# Patient Record
Sex: FEMALE | Race: WHITE | Employment: FULL TIME | URBAN - METROPOLITAN AREA
[De-identification: names, ages, dates, MRNs, and addresses within clinical notes are randomized per-mention and may not be internally consistent; named-entity substitution may affect disease eponyms.]

---

## 2019-10-15 ENCOUNTER — TELEPHONE (OUTPATIENT)
Dept: NEUROLOGY | Age: 34
End: 2019-10-15

## 2019-10-16 ENCOUNTER — TELEPHONE (OUTPATIENT)
Dept: NEUROLOGY | Age: 34
End: 2019-10-16

## 2019-10-16 NOTE — TELEPHONE ENCOUNTER
No referral rcvd, called referring office and left v/m with stephie in referral dept. Request referral and notes, called pt to advise and left v/m.

## 2019-10-16 NOTE — TELEPHONE ENCOUNTER
----- Message from Stewart Moreno sent at 10/16/2019  1:54 PM EDT -----  Regarding: Dr. Niya Salcedo  Patient return call    Caller's first and last name and relationship (if not the patient):  pt    Best contact number(s):  [phone]    Whose call is being returned:  unsure    Details to clarify the request:  Returning a miss call from the office in regards to scheduling an appt. Pt requesting an appt before the end of the month.      Stewart Moreno

## 2019-10-31 ENCOUNTER — OFFICE VISIT (OUTPATIENT)
Dept: NEUROLOGY | Age: 34
End: 2019-10-31

## 2019-10-31 DIAGNOSIS — F41.9 ANXIETY DISORDER, UNSPECIFIED TYPE: Primary | ICD-10-CM

## 2019-10-31 NOTE — PROGRESS NOTES
This note will not be viewable in [address] [facility] [address]    Office:  [phone]  Fax: [phone]                 Initial Office Exam    Patient Name: Abby James  Age: 29 y.o. 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 Primary Care Physician: Jose Raul Valero  Referring Provider: Jael Pitts PA-C      REASON FOR REFERRAL:  This comprehensive and medically necessary neuropsychological assessment was requested to assist a differential diagnosis of ADHD. The use and purpose of this examination, as well as the extent and limitations of confidentiality, were explained prior to obtaining permission to participate. Instructions were provided regarding the necessity to put forth optimal effort and answer questions truthfully in order to obtain reliable and accurate test results. PERTINENT HISTORY:  Ms. Madonna Link presented for a neuropsychological assessment at the recommendation of she treating physician secondary to complaints of attentional problems that include not listening, easily distracted, loses track of things, starts tasks but doesn't complete them, difficulty completing task in the allotted time, racing thoughts, and talks rapidly . Ms. Madonna Link began noticing symptoms about a year ago. From a brief review of her medical and personal history there has not been any other significant neurological injury or illness noted or reported. She did not report experiencing depression or anxiety in the past.  She has a family history of ADHD in her mom and sister. Another sister is described as having mental health concerns, but not de    Ms. Cormier does not  report any problems at birth or difficulties meeting developmental milestones. She reports that she had an adequate level of family support and was not subject to trauma or abuse as a child. Ms. Beverly Abrams does not  report being retain in school or receiving special assistance in any of she classes or subjects. Ms. Beverly Abrams completed 14 years of education. Ms. Beverly Abrams does  exercise on a regular basis and does  maintain a balanced diet. She does  report problems with her sleep and does not  complain of pain. She does  participate in mentally stimulating activities. Ms. Beverly Abrams does not  have concerns regarding prescription medications, family members, place of residence, or financial stressors. Ms. Beverly Abrams indicated that she is independent in her instrumental activities of daily living, including shopping, meal preparation, housekeeping, doing laundry, driving a car, managing medications, and finances. No current outpatient medications on file. No current facility-administered medications for this visit. No past medical history on file. No flowsheet data found. No data recorded    No past surgical history on file. Social History     Socioeconomic History    Marital status: SINGLE     Spouse name: Not on file    Number of children: Not on file    Years of education: Not on file    Highest education level: Not on file       No family history on file. CT Results (most recent):  No results found for this or any previous visit. MRI Results (most recent):  No results found for this or any previous visit. MENTAL STATUS:    Orientation: Fully oriented   Eye Contact: Good eye contact   Motor Behavior:  Ambulates independently   Speech:  Rapid, fluent, intelligible   Thought Process:  Tangential    Thought Content: No evidence of hallucinations or delusions   Suicidal ideations: Denies   Mood:  Euthymic   Affect:  Full range   Concentration:  adequate   Abstraction:  adequate   Insight:  WNL     On the Modified Mini-Mental Status Exam: 93/100 (WNL)      DIAGNOSTIC IMPRESSIONS:    ICD-10-CM ICD-9-CM    1. Anxiety disorder, unspecified type F41.9 300.00              PLAN:  1. Complete a comprehensive neuropsychological assessment to provide a differential diagnosis of presenting concerns as well as to assist with disposition and treatment planning as appropriate. 2. Consider compensatory and remedial cognitive training. 3. Consider nonpharmacological interventions for anxiety management. 55976 x 1 Review of records. Face to face interview w/ patient. Determine test protocol: 60 minutes. Total 1 unit        Caleb Fernandez, PhD, ABPP, LCP  Licensed Clinical Psychologist/ Neuropsychologist        This note will not be viewable in [address]

## 2019-11-25 ENCOUNTER — OFFICE VISIT (OUTPATIENT)
Dept: NEUROLOGY | Age: 34
End: 2019-11-25

## 2019-11-25 DIAGNOSIS — G31.84 AMNESTIC MCI (MILD COGNITIVE IMPAIRMENT WITH MEMORY LOSS): Primary | ICD-10-CM

## 2019-11-27 NOTE — PROGRESS NOTES
This note will not be viewable in [address] [facility] at [facility] [address], [address] [facility]  [address] [place] [address] Ne, [address]    Office:  [phone]  Fax: [phone]                                               Neuropsychological Evaluation Report    Patient Name: Elizabeth Cavazos  Age: 29 y.o. 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 Primary Care Physician: Anton Camacho  Referring Provider: Anibal Neff PA-C       PATIENT HISTORY (OBTAINED DURING INITIAL CLINICAL EVALUATION):    REASON FOR REFERRAL:  This comprehensive and medically necessary neuropsychological assessment was requested to assist a differential diagnosis of ADHD. The use and purpose of this examination, as well as the extent and limitations of confidentiality, were explained prior to obtaining permission to participate. Instructions were provided regarding the necessity to put forth optimal effort and answer questions truthfully in order to obtain reliable and accurate test results.     PERTINENT HISTORY:  Ms. Swati Gracia presented for a neuropsychological assessment at the recommendation of she treating physician secondary to complaints of attentional problems that include not listening, easily distracted, loses track of things, starts tasks but doesn't complete them, difficulty completing task in the allotted time, racing thoughts, and talks rapidly . Ms. Swati Gracia began noticing symptoms about a year ago. From a brief review of her medical and personal history there has not been any other significant neurological injury or illness noted or reported. She did not report experiencing depression or anxiety in the past.  She has a family history of ADHD in her mom and sister. Another sister is described as having mental health concerns, but not de     Ms. Cormier does not  report any problems at birth or difficulties meeting developmental milestones. She reports that she had an adequate level of family support and was not subject to trauma or abuse as a child. Ms. Aneudy Blackmon does not  report being retain in school or receiving special assistance in any of she classes or subjects. Ms. Aneudy Blackmon completed 14 years of education.     Ms. Cormier does  exercise on a regular basis and does  maintain a balanced diet. She does  report problems with her sleep and does not  complain of pain. She does  participate in mentally stimulating activities. Ms. Aneudy Blackmon does not  have concerns regarding prescription medications, family members, place of residence, or financial stressors. Ms. Aneudy Blackmon indicated that she is independent in her instrumental activities of daily living, including shopping, meal preparation, housekeeping, doing laundry, driving a car, managing medications, and finances. METHODS OF ASSESSMENT (Current Evaluation):  Clinician Administered:  Clinical Interview  Review of Medical Records  Brown Attention Deficit Scales  Clock Drawing Task  Modified Mini-Mental Status Exam (3MS)  Test of Premorbid Functioning  Personality Assessment Inventory  Revised Memory and Behavior Checklist    Technician Administered:  Ever's Continuous Performance Test -3  Neuropsychological Assessment Battery   NAB: Attention Module   NAB: Executive Functions Module  RBANS-A  Structured Inventory of Malingering Symptoms  Trail Making Test  Word Choice Effort Test (ACS)    TEST OBSERVATIONS:  Ms. Aneudy Blackmon arrived promptly for the testing session. Dress and grooming were appropriate; physical presentation was unchanged from that observed during the clinical interview. Speech was fluent, intelligible, and goal-directed. Affect was congruent with the euthymic mood conveyed. Ms. Aneudy Blackmon was adequately cooperative and appeared to put forth good effort throughout this examination.   Rapport with the examiner was adequately established and maintained. Minimal prompting was required. Comprehension of test instructions was not problematic. Performance motivation was objectively measured by four instruments (JUAN, WC Effort, Reliable Digit Span, RBANS ES), and Ms. Cormier produced a normal score on four of these measures. Accordingly, test findings below do not appear to be the product of disingenuous effort. Given the above observations, plus comments contained in the Mental Status section, the results of this examination are regarded as reasonably reliable and valid. TEST RESULTS:  Quantitative test results are derived from comparisons to age and education corrected cohort normative data, where applicable. Percentiles are included in these instances. Qualitative test results are determined using clinical observations. General Orientation and Awareness:       Orientation to person, year, month, day of month, day of week, state, town, and circumstance.    Awareness of deficits:WNL                   Cognitive performance validity testing: Valid        Attention/Concentration:      Classification:      Coding (70 percentile)           Average  Simple visuomotor tracking (31 percentile)               Average  Digits forward (76 percentile)                 High Average  Digits backward (18 percentile)                 Low Average  Visual scanning (1 percentile)                            Severely Impaired  Simple information processing  efficiency (31 percentile)  Average   Complex information processing efficiency (5 percentile)  Mildly Impaired  Attention to visual detail of driving scenes (<1 percentile)             Severely Impaired    Naomi Continuous Performance Test - 3    Variable Type Measure T-Score Percentile Guideline     Detectability     d'   58   79   High Average     Error Type     Omissions     48   42   Average      Commissions     63   90   Elevated    Perseverative  Commissions   48   42 Average     Reaction Time     HRT     53   62   Average        HRT SD     54   66   Average      HRT Block  Change   54   66   Average       Visuospatial and Constructional Praxis:     Figure copy (45 percentile)                                       Average  Line orientation (86 percentile)                                          High Average     Language:         Picture naming (70 percentile)                               Average  Semantic fluency (23 percentile)                    Low Average    Memory and Learning:       Immediate word list learning (5 percentile)               Mildly Impaired  Delayed word list recall (3 percentile)                  Moderately Impaired  Delayed word list recognition (0.5 percentile)               Severely Impaired  Immediate story memory (0.3 percentile)                  Severely Impaired  Delayed story recall (2 percentile)                Moderately Impaired  Delayed figure recall (4 percentile)                Moderately Impaired    Cognitive Tests of Executive Functioning:     Ability to think flexibly, Trail Making B (35 percentile)              Average  Mazes (95 percentile)                  Superior  Simple judgment in daily decision making (54 percentile)             Average  Categories (18 percentile)                 Low Average  Word generation (38 percentile)                  Average    Intellectual and Basic Cognitive Functioning (WAIS-IV):  ACS Test of pre-morbid functioning reading recognition lower limit estimated WAIS-IV FSIQ score:       Estimated full scale IQ:       97      42 percentile      Average    Emotional Functioning:  Ms. Isabelle Slaughter was administered the AARON to determine the level of emotional distress she may be experiencing at the time of this evaluation. Validity scales within the AARON suggest that she may not have been completely forthright her responses which may lead to an inaccurate impression of her due to her style of responding.   She has a tendency to present herself in a favorable light and as being relatively free of common shortcomings. This tendency may lead her to a minimize or perhaps even be unaware of problem areas. As such, the validity and reliability of this assessment should be considered with caution. In general, her clinical profile reveals no elevations of clinical concern. IMPRESSIONS:  Ms. Elham Ny was seen for a neuropsychological evaluation to determine her current level of cognitive functioning. She was administered a battery of measures across the domains of attention, visual-spatial, language, memory and executive functioning. Her overall level of cognitive functioning was in the low average range, with her performance on measures of attention, visual-spatial, language, and executive functioning all falling within the low average to average range. Specific problem areas for Ms. Cormier included visual working memory, visual attention to detail, and complex information processing. Her performance on a measure of inattention, sustain attention, impulsivity, and vigilance, failed to provide support for the diagnosis of ADHD. The only measures falling significantly below expectations were those of memory function, and her memory domain overall was in the severely impaired range. In summary, this current evaluation is most consistent with a fairly circumscribed memory or amnestic disorder. 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 Findings should be reviewed with Ms. Cormier to insure her understanding and discuss the potential implications. 2. Emphasis should be placed on Ms. Cormier obtaining good sleep hygiene and maintaining adequate physical exercise to promote good brain health. 3. Ms. Tonya Chicas may benefit from a referral to psychotherapy to address anxiety and work on adequate stress management skills. 4. Ms. Tonya Chicas is encouraged to seek out various forms of mental stimulation that would help to \"exercise\" her brain. This might include learning a new skill, hobby, travel, attending lectures, or evening reading or listening to podcasts or videos on topics of her interest.      Thank you for allowing me the opportunity to assist you in Ms. Cormier's care. Please do not hesitate to contact me should you have additional questions that I may not have addressed. 85187 x 1  96138 x 1  96139 x 6  96132 x 1  96133 x 2          Daxa Alvarez, Ph.D., ABPP  Licensed Clinical Psychologist  Neuropsychologist  Board Certified Rehabilitation Psychologist      Time Documentation:     00775 x 1: Neurobehavioral Status Exam/Clinical Interview: (1 hour, (already billed on first date of service)     50485*8 Neuropsych testing/data gathering by Neuropsychologist (35 additional minutes, see above)      96138 x 1  96139 x 6 Test Administration/Data Gathering By Technician: (3.5 hours). 67721 x 1 (first 30 minutes), 72799 x 7 (each additional 30 minutes)     96132 x 1  96133 x 1 Testing Evaluation Services by Neuropsychologist (1 hour, 50 minutes) 96132 x 1 (first hour), 96133 x 1 (50 minutes)     Definitions:       48945/56142:  Neurobehavioral Status Exam, Clinical interview.   Clinical assessment of thinking, reasoning and judgment, by neuropsychologist, both face to face time with patient and time interpreting those test results and reporting, first and subsequent hours)     20336/50790: Neuropsychological Test Administration by Technician/Psychometrist, first 30 minutes and each additional 30 minutes. The above includes: Record review. Review of history provided by patient. Review of collaborative information. Testing by Clinician. Review of raw data. Scoring. Report writing of individual tests administered by Clinician. Integration of individual tests administered by psychometrist with NSE/testing by clinician, review of records/history/collaborative information, case Conceptualization, treatment planning, clinical decision making, report writing, coordination Of Care. Psychometry test codes as time spent by psychometrist administering and scoring neurocognitive/psychological tests under supervision of neuropsychologist.  Integral services including scoring of raw data, data interpretation, case conceptualization, report writing etcetera were initiated after the patient finished testing/raw data collected and was completed on the date the report was signed. This note will not be viewable in [address]

## 2019-12-06 ENCOUNTER — TELEPHONE (OUTPATIENT)
Dept: NEUROLOGY | Age: 34
End: 2019-12-06

## 2025-06-17 ENCOUNTER — OFFICE VISIT (OUTPATIENT)
Age: 40
End: 2025-06-17

## 2025-06-17 VITALS
HEIGHT: 68 IN | HEART RATE: 75 BPM | OXYGEN SATURATION: 90 % | TEMPERATURE: 97.9 F | BODY MASS INDEX: 20.92 KG/M2 | SYSTOLIC BLOOD PRESSURE: 108 MMHG | RESPIRATION RATE: 18 BRPM | DIASTOLIC BLOOD PRESSURE: 76 MMHG | WEIGHT: 138 LBS

## 2025-06-17 DIAGNOSIS — R35.0 FREQUENT URINATION: ICD-10-CM

## 2025-06-17 DIAGNOSIS — N92.6 IRREGULAR MENSES: ICD-10-CM

## 2025-06-17 DIAGNOSIS — R39.9 UTI SYMPTOMS: Primary | ICD-10-CM

## 2025-06-17 LAB
BILIRUBIN, URINE, POC: NEGATIVE
BLOOD URINE, POC: NEGATIVE
GLUCOSE URINE, POC: NEGATIVE
HCG, PREGNANCY, URINE, POC: NEGATIVE
KETONES, URINE, POC: NEGATIVE
LEUKOCYTE ESTERASE, URINE, POC: NEGATIVE
NITRITE, URINE, POC: NEGATIVE
PH, URINE, POC: 7 (ref 4.6–8)
PROTEIN,URINE, POC: NEGATIVE
SPECIFIC GRAVITY, URINE, POC: 1.01 (ref 1–1.03)
URINALYSIS CLARITY, POC: CLEAR
URINALYSIS COLOR, POC: YELLOW
UROBILINOGEN, POC: NORMAL MG/DL
VALID INTERNAL CONTROL, POC: NORMAL

## 2025-06-17 RX ORDER — PHENAZOPYRIDINE HYDROCHLORIDE 200 MG/1
200 TABLET, FILM COATED ORAL 3 TIMES DAILY PRN
Qty: 9 TABLET | Refills: 0 | Status: SHIPPED | OUTPATIENT
Start: 2025-06-17 | End: 2025-06-20

## 2025-06-17 RX ORDER — LEVONORGESTREL AND ETHINYL ESTRADIOL 0.15-0.03
1 KIT ORAL DAILY
COMMUNITY
Start: 2025-06-04

## 2025-06-17 RX ORDER — DEXTROAMPHETAMINE SACCHARATE, AMPHETAMINE ASPARTATE, DEXTROAMPHETAMINE SULFATE AND AMPHETAMINE SULFATE 2.5; 2.5; 2.5; 2.5 MG/1; MG/1; MG/1; MG/1
1 TABLET ORAL DAILY
COMMUNITY
Start: 2025-05-22

## 2025-06-17 RX ORDER — NITROFURANTOIN 25; 75 MG/1; MG/1
100 CAPSULE ORAL 2 TIMES DAILY
Qty: 10 CAPSULE | Refills: 0 | Status: SHIPPED | OUTPATIENT
Start: 2025-06-17 | End: 2025-06-22

## 2025-06-17 RX ORDER — DEXTROAMPHETAMINE SACCHARATE, AMPHETAMINE ASPARTATE MONOHYDRATE, DEXTROAMPHETAMINE SULFATE AND AMPHETAMINE SULFATE 5; 5; 5; 5 MG/1; MG/1; MG/1; MG/1
1 CAPSULE, EXTENDED RELEASE ORAL EVERY MORNING
COMMUNITY
Start: 2025-05-22

## 2025-06-17 ASSESSMENT — ENCOUNTER SYMPTOMS: RESPIRATORY NEGATIVE: 1

## 2025-06-17 NOTE — PROGRESS NOTES
2025   Madison Daley (: 1985) is a 39 y.o. female, New patient, here for evaluation of the following chief complaint(s):  Urinary Tract Infection (Pt states that about 2 weeks ago she was treated for a UTI but after 5 days of doxycyline her sx have not resolved. Pt states that she is mostly just dealing with bladder spasms now.  )       ASSESSMENT/PLAN:  Below is the assessment and plan developed based on review of pertinent history, physical exam, labs, studies, and medications.  1. UTI symptoms  2. Frequent urination  -     AMB POC URINALYSIS DIP STICK MANUAL W/O MICRO  -     Culture, Urine; Future  3. Irregular menses  -     AMB POC URINE PREGNANCY TEST, VISUAL COLOR COMPARISON    - nitrofurantoin  - pyridium    Since treated with doxycycline and may not have been completely treated, will send UCx and treat presumptively with Macrobid      Handout given with care instructions  Pt with stable VS, non-toxic appearing  Pt in no acute distress and afebrile   4.   OTC for symptom management. Increase fluid intake, ensure adequate nutritional intake.  5.   Follow up with PCP as needed.  6.   Go to ED with development of any acute symptoms.     Follow up:  Return if symptoms worsen or fail to improve.  Follow up immediately for any new, worsening or changes or if symptoms are not improving over the next 5-7 days.     SUBJECTIVE/OBJECTIVE:  HPI       Urinary Tract Infection (Pt states that about 2 weeks ago she was treated for a UTI but after 5 days of doxycyline her sx have not resolved. Pt states that she is mostly just dealing with bladder spasms now.  )        Results for orders placed or performed in visit on 25   AMB POC URINALYSIS DIP STICK MANUAL W/O MICRO   Result Value Ref Range    Color (UA POC) Yellow     Clarity (UA POC) Clear     Glucose, Urine, POC Negative     Bilirubin, Urine, POC Negative     Ketones, Urine, POC Negative     Specific Gravity, Urine, POC 1.010 1.001 - 1.035    Blood

## 2025-06-18 LAB — SPECIMEN HOLD: NORMAL
